# Patient Record
Sex: FEMALE | Race: WHITE | NOT HISPANIC OR LATINO | URBAN - METROPOLITAN AREA
[De-identification: names, ages, dates, MRNs, and addresses within clinical notes are randomized per-mention and may not be internally consistent; named-entity substitution may affect disease eponyms.]

---

## 2022-11-22 DIAGNOSIS — R87.612 LGSIL ON PAP SMEAR OF CERVIX: ICD-10-CM

## 2023-03-10 ENCOUNTER — PROCEDURE VISIT (OUTPATIENT)
Dept: GYNECOLOGY | Facility: CLINIC | Age: 28
End: 2023-03-10
Payer: MEDICAID

## 2023-03-10 VITALS
HEART RATE: 83 BPM | SYSTOLIC BLOOD PRESSURE: 111 MMHG | DIASTOLIC BLOOD PRESSURE: 71 MMHG | OXYGEN SATURATION: 100 % | WEIGHT: 139 LBS | RESPIRATION RATE: 20 BRPM | TEMPERATURE: 98 F | BODY MASS INDEX: 25.58 KG/M2 | HEIGHT: 62 IN

## 2023-03-10 DIAGNOSIS — R87.612 LGSIL ON PAP SMEAR OF CERVIX: ICD-10-CM

## 2023-03-10 LAB
B-HCG UR QL: NEGATIVE
CTP QC/QA: YES

## 2023-03-10 PROCEDURE — 57454 BX/CURETT OF CERVIX W/SCOPE: CPT | Mod: PBBFAC | Performed by: STUDENT IN AN ORGANIZED HEALTH CARE EDUCATION/TRAINING PROGRAM

## 2023-03-10 PROCEDURE — 81025 URINE PREGNANCY TEST: CPT | Mod: PBBFAC

## 2023-03-10 NOTE — PROGRESS NOTES
"[facility] Colposcopy Clinic Note    Subjective:     Referring Provider: [facility]    HPI:   Crystal Welch is a 27 y.o.  presents for colposcopy. Results of abnormal pap smear were discussed with patient. Indications/risks/benefits of colposcopy were discussed and consents were signed and witnessed prior to the procedure, all questions answered.      Today the patient has no complaints.    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    Previous excisional procedure: no    Tobacco use: no    Contraception: no method    Sexual history: single partner, contraception - none    STD History: remote h/o chlamydia s/p tx    Review of Systems  Pertinent items are noted in HPI.     Objective:     Vitals:    03/10/23 1408   BP: 111/71   Pulse: 83   Resp: 20   Temp: 98.3 °F (36.8 °C)   SpO2: 100%   Weight: 63 kg (139 lb)   Height: 5' 2" (1.575 m)       Body mass index is 25.42 kg/m².    No LMP recorded.    Physical Exam:  General: Alert, cooperative, no distress, appears stated age  Abdomen: Soft, non-tender, no masses  Extremities: Extremities normal, atraumatic, no cyanosis or edema.  No calf tenderness or erythema.  External genitalia: Normal female genetalia without lesion, discharge or tenderness.  Speculum Exam: Vaginal vault with physiologic discharge, nonodorous, no lesions/masses seen.  Nulliparous cervix, cervical os visualized with no gross lesions/masses. Entire SCJ visualized. Application of acetic acid revealed acetowhite lesions at 12:00 and from 3:00 to 5:00.           UPT Result:   Recent Results (from the past 24 hour(s))   POCT Urine Pregnancy    Collection Time: 03/10/23  2:08 PM   Result Value Ref Range    POC Preg Test, Ur Negative Negative     Acceptable Yes        Procedures:     Date of procedure:  03/10/2023    Procedure:  Crystal Welch was counseled on risks, benefits, alternatives to her procedure today. A consent form was reviewed and " signed. All questions were answered to her satisfaction.    After discussing and obtaining informed consent, a time out was performed confirming her identity, surgical site, planned procedure and that all necessary equipment was available. She was positioned on the exam table in dorsal lithotomy position. A Speculum placed in vagina and excellent visualization of cervix achieved, cervix was swabbed with acetic acid solution, SCJ visualized 360 degrees, aceto-white lesion noted at 12:00 and from 3:00 to 5:00 positions. 12:00 lesions appeared to extend into endocervix, unable to see full lesion. Biopsies were taken at 12:00 and 3:00. Both ECC and endocervical brush used to obtain sample. Specimens were labelled and sent to pathology and hemostasis achieved with Monsel's solution.    Complications: none    Estimated blood loss: Minimal    Findings:   Colposcopy was not satisfactory given could not visualize entire 12:00 lesion  Mild dysplasia.    No mosaicism, no punctation and no abnormal vasculature  Patient tolerant of in-office procedure:  yes/no    Specimens:  12:00, 3:00, ECC    Assessment/Plan:       27 y.o.  s/p colposcopy after LSIL pap.    Problem List Items Addressed This Visit    None  Visit Diagnoses       LGSIL on Pap smear of cervix        Relevant Orders    Specimen to Pathology Gynecology and Obstetrics    POCT Urine Pregnancy (Completed)            Precautions given to the patient to return to ED if vaginal bleeding, fevers, pain, or any other concerns.  Patient expressed understanding and all questions were answered.    Will f/up re results by telephone call when results available    Dr Kim was present for the procedure.     Marybel Pascual MD  [facility] PGY2  03/10/2023 11:20 PM

## 2023-03-13 PROCEDURE — 88305 TISSUE EXAM BY PATHOLOGIST: CPT | Mod: TC | Performed by: STUDENT IN AN ORGANIZED HEALTH CARE EDUCATION/TRAINING PROGRAM

## 2023-03-15 LAB
ESTROGEN SERPL-MCNC: NORMAL PG/ML
INSULIN SERPL-ACNC: NORMAL U[IU]/ML
LAB AP CLINICAL INFORMATION: NORMAL
LAB AP GROSS DESCRIPTION: NORMAL
LAB AP REPORT FOOTNOTES: NORMAL
T3RU NFR SERPL: NORMAL %

## 2023-03-17 NOTE — PROGRESS NOTES
Called Ms Crystal Welch to discuss colposcopy results.    Pap 11/2022 LSIL    Colposcopy 03/2023  1. 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      2. 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      3. Endocervix, curettage:   - Fragments of benign endocervical mucosa.       Discussed per ASCCP guidelines, recommend repeat pap/HPV cotest in 1 year. Patient can complete at the [facility]. Patient confirms understanding.    Marybel Pascual MD  [facility] PGY2  03/17/2023 4:39 PM